# Patient Record
Sex: FEMALE | Race: WHITE | NOT HISPANIC OR LATINO | Employment: OTHER | URBAN - METROPOLITAN AREA
[De-identification: names, ages, dates, MRNs, and addresses within clinical notes are randomized per-mention and may not be internally consistent; named-entity substitution may affect disease eponyms.]

---

## 2017-06-12 ENCOUNTER — PATIENT OUTREACH (OUTPATIENT)
Dept: ADMINISTRATIVE | Facility: HOSPITAL | Age: 82
End: 2017-06-12

## 2017-06-12 NOTE — PROGRESS NOTES
According to the [facility] patient portal, the patient  on 2015. The patient's chart was updated on today reflecting the change and an email will be sent to HIM  patients.      A letter was mailed to the patient on today in regards to the information below.    The patient is due for a hypertension follow up and fasting blood work. The patient is also due for immunizations(pneumonia & tetanus), mammogram, and bone mineral density.